# Patient Record
Sex: FEMALE | URBAN - METROPOLITAN AREA
[De-identification: names, ages, dates, MRNs, and addresses within clinical notes are randomized per-mention and may not be internally consistent; named-entity substitution may affect disease eponyms.]

---

## 2023-01-21 ENCOUNTER — TELEPHONE ENCOUNTER (OUTPATIENT)
Dept: URBAN - METROPOLITAN AREA CLINIC 9 | Facility: CLINIC | Age: 58
End: 2023-01-21

## 2023-01-21 VITALS — WEIGHT: 168 LBS | HEIGHT: 66 IN | BODY MASS INDEX: 27 KG/M2

## 2023-02-21 ENCOUNTER — OFFICE VISIT (OUTPATIENT)
Dept: URBAN - METROPOLITAN AREA CLINIC 9 | Facility: CLINIC | Age: 58
End: 2023-02-21

## 2023-04-10 ENCOUNTER — OFFICE VISIT (OUTPATIENT)
Dept: URBAN - METROPOLITAN AREA CLINIC 9 | Facility: CLINIC | Age: 58
End: 2023-04-10

## 2023-04-10 RX ORDER — TRAMADOL HYDROCHLORIDE 50 MG/1
TABLET, COATED ORAL
Qty: 10 TABLET | Refills: 0 | Status: ON HOLD | COMMUNITY

## 2023-04-10 RX ORDER — SODIUM, POTASSIUM,MAG SULFATES 17.5-3.13G
AS DIRECTED SOLUTION, RECONSTITUTED, ORAL ORAL
OUTPATIENT

## 2023-04-10 RX ORDER — PREDNISONE 20 MG/1
TAKE 2 TABLETS BY MOUTH DAILY FOR 5 DAYS TABLET ORAL
Qty: 10 EACH | Refills: 0 | Status: ACTIVE | COMMUNITY

## 2023-04-10 RX ORDER — CYCLOBENZAPRINE HYDROCHLORIDE 10 MG/1
TABLET, FILM COATED ORAL
Qty: 20 EACH | Refills: 0 | Status: ACTIVE | COMMUNITY

## 2023-04-10 NOTE — PHYSICAL EXAM CONSTITUTIONAL:
Some leg cramps.  Occasional headaches.  Discussed treatment.    well developed, well nourished , in no acute distress , ambulating without difficulty , normal communication ability

## 2023-04-13 ENCOUNTER — OFFICE VISIT (OUTPATIENT)
Dept: URBAN - METROPOLITAN AREA CLINIC 9 | Facility: CLINIC | Age: 58
End: 2023-04-13